# Patient Record
Sex: MALE | NOT HISPANIC OR LATINO | URBAN - NONMETROPOLITAN AREA
[De-identification: names, ages, dates, MRNs, and addresses within clinical notes are randomized per-mention and may not be internally consistent; named-entity substitution may affect disease eponyms.]

---

## 2018-05-22 ENCOUNTER — OFFICE VISIT CONVERTED (OUTPATIENT)
Dept: FAMILY MEDICINE CLINIC | Age: 78
End: 2018-05-22
Attending: NURSE PRACTITIONER

## 2018-08-31 ENCOUNTER — OFFICE VISIT CONVERTED (OUTPATIENT)
Dept: FAMILY MEDICINE CLINIC | Age: 78
End: 2018-08-31
Attending: FAMILY MEDICINE

## 2018-09-07 ENCOUNTER — OFFICE VISIT CONVERTED (OUTPATIENT)
Dept: FAMILY MEDICINE CLINIC | Age: 78
End: 2018-09-07
Attending: FAMILY MEDICINE

## 2018-11-26 ENCOUNTER — OFFICE VISIT CONVERTED (OUTPATIENT)
Dept: FAMILY MEDICINE CLINIC | Age: 78
End: 2018-11-26
Attending: FAMILY MEDICINE

## 2018-12-10 ENCOUNTER — OFFICE VISIT CONVERTED (OUTPATIENT)
Dept: FAMILY MEDICINE CLINIC | Age: 78
End: 2018-12-10
Attending: FAMILY MEDICINE

## 2019-04-09 ENCOUNTER — OFFICE VISIT CONVERTED (OUTPATIENT)
Dept: FAMILY MEDICINE CLINIC | Age: 79
End: 2019-04-09
Attending: FAMILY MEDICINE

## 2021-05-18 NOTE — PROGRESS NOTES
"Geoff Mcduffie 1940     Office/Outpatient Visit    Visit Date: Fri, Sep 7, 2018 11:25 am    Provider: Nestor Guzman MD (Assistant: Sarah Spurling, MA)    Location: [facility]        Electronically signed by Nestor Guzman MD on  09/18/2018 12:58:49 PM                             SUBJECTIVE:        CC:     Edgard is a 78 year old White male.  This is a follow-up visit.  The patient is accompanied into the exam room by his Self.          HPI: He is here today to follow-up on lab results that reveled elevated ALT and AST as well as elevated GGT. He denies ever experiencing jaudice. Denies history of hepatitis infection. Denies abdominal swelling or pain.         He has cut back his EtOH consumption in recent weeks from 2-3 drinks a night to \"a couple drinks a week\"  He has hx of Alcohol Use Disorder in the past.  He admits that since his return to [facility] without Nohemi to keep him in check he had been drinking more, and mostly at home by himself, which he knows is an issues.  He has gone thru AA in the past and doesn't feel like he is at the point that he needs to do that again.         He has received his first Hep A vaccine.         Patient presents with mixed hyperlipidemia.  He specifically denies associated symptoms, including muscle pain and weakness.      I did not on his exam that he had a darkly colored, irregularly shaped skin lesion on his right upper back that I told him I thought ought to be removed.     ROS:     CONSTITUTIONAL:  Negative for chills, fever, night sweats, unintentional weight gain and unintentional weight loss.      E/N/T:  Negative for nasal congestion, frequent rhinorrhea and sore throat.      CARDIOVASCULAR:  Negative for chest pain, palpitations and pedal edema.      RESPIRATORY:  Negative for recent cough and dyspnea.      GASTROINTESTINAL:  Negative for constipation, diarrhea, nausea and vomiting.      NEUROLOGICAL:  Negative for ataxia, dizziness, fainting " and headaches.          PMH/FMH/SH:     Last Reviewed on 12/22/2017 09:11 AM by Nestor Guzman    Past Medical History:         Horseshoe Kidney     Bladder Cancer         PREVENTIVE HEALTH MAINTENANCE             COLORECTAL CANCER SCREENING: Up to date (colonoscopy q10y; sigmoidoscopy q5y; Cologuard q3y) was last done 7/2/12; colonoscopy with normal results     EYE EXAM: was last done 2013 (Vitaly/Julio)     PSA: was last done 5/10/17 with normal results         PAST MEDICAL HISTORY             CURRENT MEDICAL PROVIDERS:    NONE             ADVANCED DIRECTIVES: None         PAST MEDICAL HISTORY                 ADVANCED DIRECTIVES: None         Surgical History:         Tonsillectomy; Adnoid Irradiation      Badder Ca;    Right rotator cuff 2013, ; Procedures: colonoscopy 6/2007, 7/2012         Family History:     Father: Prostate Cancer         Social History:     Occupation: Retired (Prior occupation: TV/radio/media)     Marital Status:      Children: 1 child         Tobacco/Alcohol/Supplements:     Last Reviewed on 8/31/2018 08:55 AM by Ruth Reece    Tobacco: He has a past history of cigarette smoking; quit date:  1990.          Alcohol: Patient has a past history of alcoholism.  His last drink was only socially on rare basis.          Substance Abuse History:     Last Reviewed on 12/22/2017 09:11 AM by Nestor Guzman        Mental Health History:     Last Reviewed on 12/22/2017 09:11 AM by Nestor Guzman        Communicable Diseases (eg STDs):     Last Reviewed on 12/22/2017 09:11 AM by Nestor Guzman            Allergies:     Last Reviewed on 8/31/2018 08:55 AM by Ruth Reece    Sulfas:    Iodine:        Current Medications:     Last Reviewed on 8/31/2018 08:55 AM by Ruth Reece    Lisinopril/Hydrochlorothiazide 20mg/12.5mg Tablet Take 1 tablet(s) by mouth daily     Simvastatin 20mg Tablet Take 1 tablet(s) by mouth at bedtime      Viagra 100mg Tablet Take 1/2 to 1 tablet(s) by mouth prn approximately 1 hour before sexual activity   Max one daily         OBJECTIVE:        Vitals:         Current: 9/7/2018 11:30:28 AM    Ht:  5 ft, 11 in;  Wt: 170.4 lbs;  BMI: 23.8    T: 97 F (oral);  BP: 156/87 mm Hg (left arm, sitting);  P: 74 bpm (right arm (BP Cuff), sitting);  sCr: 0.9 mg/dL;  GFR: 65.29        Exams:     PHYSICAL EXAM:     GENERAL: well nourished;  well groomed;  no apparent distress;     EYES: nonicteric;     E/N/T: NOSE: OROPHARYNX:  normal mucosa, dentition, gingiva, and posterior pharynx;     RESPIRATORY:  lungs clear to auscultation and percussion; symmetric expansion; no dyspnea;     CARDIOVASCULAR: normal rate; rhythm is regular;  no systolic murmur;     GASTROINTESTINAL: nontender, nondistended; no hepatosplenomegaly or masses; no bruits;     SKIN: no spider hemagioma noted; he does have a dark, irregularly shaped nevi right upper back/posterior shoulder area that I circled and told him I thought ought to be removed     NEUROLOGIC: no asterixis.  Rhomberg negative.;     PSYCHIATRIC: no hallucinations.  Good insight into his alcohol issue.  Not quite ready to say he needs to quit alcohol altogether.; He thinks he can cut back and keep consumption under control. He says he has been to enough AA meetings that he know often time abstinence is necessary.         ASSESSMENT           272.2   E78.2  Mixed hyperlipidemia              DDx:     790.4   R74.0  Elevated LFTs              DDx:     239.2   R94.5  Unspecified skin lesion              DDx:     305.00   F10.10  Alcohol abuse, unspecified              DDx:         ORDERS:         Lab Orders:       54061  FERR - [facility] Ferritin Serum  (Send-Out)         49822  IRONP - [facility] Iron and TIBC  (Send-Out)         99740  AHEP4 - [facility] Hepatitis Panel (HAAb, HbcAB, HbsAG, Hep C antibody)  (Send-Out)         12016  Prothrombin time  (In-House)         APPTO  Appointment need  (In-House)                    PLAN:          Mixed hyperlipidemia reviewed lab, cont rx at this point.  I don't thing LFT are due to the medication.         FOLLOW-UP: Schedule a follow-up visit in 3 months..            Orders:       APPTO  Appointment need  (In-House)            Elevated LFTs Will ck lab.  His problem is most likely alcohol.  He wants to try to limit his alcohol use.  He knows the difficulty of that strategy, but wants to try before returning to his life of abstinence .      LABORATORY:  Labs ordered to be performed today include Anemia profile ferritin Serum iron and hepatitis panel.            Orders:       81974  FERR - [facility] Ferritin Serum  (Send-Out)         53873  IRONP - [facility] Iron and TIBC  (Send-Out)         33991  AHEP4 - [facility] Hepatitis Panel (HAAb, HbcAB, HbsAG, Hep C antibody)  (Send-Out)         38616  Prothrombin time  (In-House)            Unspecified skin lesion return for removal          Alcohol abuse, unspecified Will see how he does in limiting his alcohol intake.  May need to go back to AA and total abstinence             Patient Recommendations:        For  Mixed hyperlipidemia:     Schedule a follow-up visit in 3 months.                APPOINTMENT INFORMATION:        Monday Tuesday Wednesday Thursday Friday Saturday Sunday            Time:___________________AM  PM   Date:_____________________             CHARGE CAPTURE           **Please note: ICD descriptions below are intended for billing purposes only and may not represent clinical diagnoses**        Primary Diagnosis:         272.2 Mixed hyperlipidemia            E78.2    Mixed hyperlipidemia              Orders:          80941   Office/outpatient visit; established patient, level 3  (In-House)             APPTO   Appointment need  (In-House)           790.4 Elevated LFTs            R74.0    Nonspecific elevation of levels of transaminase and lactic acid dehydrogenase [LDH]              Orders:          39287   Prothrombin time  (In-House)            239.2 Unspecified skin lesion            R94.5    Abnormal results of liver function studies    305.00 Alcohol abuse, unspecified            F10.10    Alcohol abuse, uncomplicated

## 2021-05-18 NOTE — PROGRESS NOTES
Geoff Mcduff[birth year]Mehnaz 1940     Office/Outpatient Visit    Visit Date: Tue, Apr 9, 2019 10:47 am    Provider: Nestor Guzman MD (Assistant: Sarah Spurling, MA)    Location: [facility]        Electronically signed by Nestor Guzman MD on  04/22/2019 09:51:17 AM                             SUBJECTIVE:        CC:     Edgard is a 78 year old White male.  Medicare Wellness.          HPI: Edgard is in today stating that he is going to be moving to [place] soon to be with his significant other and family.  Says that getting back in relationship with her has helped him address his own health needs and has been instrumental in his cutting back on the alcohol.         Edgard is here for a Medicare wellness visit.  ADVANCED DIRECTIVES: None     Returning to health checkup, the required HRA questions are integrated within this visit note. Family medical history and individual medical/surgical history were reviewed and updated.  A current height, weight, BMI, blood pressure, and pulse were recorded in the vitals section of the note and have been reviewed. Patient's medications, including supplements, were recorded in the chart and reviewed.  Current providers and suppliers were reviewed and updated.          Self-Assessment of Health: He rates his health as excellent. He rates his confidence of being able to control/manage most of his health problems as very confident. His physical/emotional health has limited his social activites not at all.  A review of cognitive impairment was performed, including ability to drive a car, manage finances, and any memory changes, and was found to be negative.  A review of functional ability, including bathing, dressing, walking, and urine/bowel continence as well as level of safety was performed and was found to be negative.  Falls Risk: Has not had any falls or only one fall without injury in the past year.  He denies having trouble hearing the TV/radio when others do not, having to  strain to hear or understand conversations and wearing hearing aid(s).  Concerning home safety, he reports that at home he DOES have adequate lighting, grab bars in the bath, functioning smoke alarms and absence of throw rugs, but not a skid resistant shower/tub or handrails on stairs.  Physical Activity: He exercises for at least 20 minutes 3 or more days/week.; Type of diet patient normally eats is described as well-balanced with fruits and vegetables Tobacco: He has a past history of cigarette smoking; quit date:  Quit Date 25 years ago.  Preventative Health updated today.          PHQ-9 Depression Screening: Completed form scanned and in chart; Total Score 0 Alcohol Consumption Screening: Completed form scanned and in chart; Total Score 3         With regard to the hypertension, he did not bring his blood pressure diary, but says that pressures have been okay.  He is tolerating the medication well without side effects.  Compliance with treatment has been good.          Mixed hyperlipidemia details; compliance with treatment has been good.  He specifically denies associated symptoms, including muscle pain and weakness.      Has a remote history of bladder cancer diagnosed when he was in his 30s and was released by the urologist from further follow-up.     ROS:     CONSTITUTIONAL:  Negative for chills, fatigue and fever.      CARDIOVASCULAR:  Negative for chest pain, orthopnea, paroxysmal nocturnal dyspnea and pedal edema.      RESPIRATORY:  Negative for dyspnea and cough.      GASTROINTESTINAL:  Negative for abdominal pain, heartburn, constipation, diarrhea, and stool changes.      GENITOURINARY:  Negative for dysuria and polyuria.      PSYCHIATRIC:  Negative for anxiety and depression.          PMH/FMH/SH:     Last Reviewed on 4/09/2019 11:58 AM by Nestor Guzman    Past Medical History:         Horseshoe Kidney     Bladder Cancer         PREVENTIVE HEALTH MAINTENANCE             COLORECTAL CANCER  "SCREENING: Up to date (colonoscopy q10y; sigmoidoscopy q5y; Cologuard q3y) was last done 7/2/12; colonoscopy with normal results     DENTAL CLEANING: was last done 6m ago. due soon.      EYE EXAM: was last done March 2019     PSA: was last done 5/10/17 with normal results         PAST MEDICAL HISTORY             CURRENT MEDICAL PROVIDERS:    NONE             ADVANCED DIRECTIVES: None         PAST MEDICAL HISTORY                 ADVANCED DIRECTIVES: None         Surgical History:         Tonsillectomy; Adnoid Irradiation      Badder Ca;    Right rotator cuff 2013, ; Procedures: colonoscopy 6/2007, 7/2012         Family History:     Father: Prostate Cancer         Social History:     Occupation: Retired (Prior occupation: TV/radio/media)     Marital Status:      Children: 1 child         Tobacco/Alcohol/Supplements:     Last Reviewed on 4/09/2019 10:53 AM by Spurling, Sarah C    Tobacco: He has a past history of cigarette smoking; quit date:  1990.          Alcohol: Patient has a past history of alcoholism.  His last drink was only socially on rare basis.      CAGE Questionnaire:  ADMITS to having tried to cut down on alcohol consumption and feeling guilty about drinking.   DENIES being annoyed when questioned about alcohol use or consuming an \"eye opener\" drink in the morning.          Substance Abuse History:     Last Reviewed on 12/22/2017 09:11 AM by Nestor Guzman        Mental Health History:     Last Reviewed on 12/22/2017 09:11 AM by Nestor Guzman        Communicable Diseases (eg STDs):     Last Reviewed on 12/22/2017 09:11 AM by Nestor Guzman            Allergies:     Last Reviewed on 4/09/2019 10:53 AM by Spurling, Sarah C    Sulfas:    Iodine:        Current Medications:     Last Reviewed on 4/09/2019 10:53 AM by Spurling, Sarah C    Lisinopril/Hydrochlorothiazide 20mg/12.5mg Tablet Take 1 tablet(s) by mouth daily     Flomax 0.4mg Capsules Take 1 capsule(s) by mouth daily " 30 min after the same meal     Simvastatin 20mg Tablet Take 1 tablet(s) by mouth at bedtime         OBJECTIVE:        Vitals:         Current: 4/9/2019 10:59:21 AM    Ht:  5 ft, 11 in;  Wt: 175.6 lbs;  BMI: 24.5    T: 97.8 F (oral);  BP: 144/67 mm Hg (right arm, sitting);  P: 73 bpm (right arm (BP Cuff), sitting);  sCr: 1.14 mg/dL;  GFR: 52.21    VA: 20/60 OD, 20/70 OS (near, without correction)        Repeat:     10:59:32 AM     VA:    (20/60 OD,  (, , 20/70 OS, , both eyes 20/60))         Exams:     PHYSICAL EXAM:     GENERAL:  well developed and nourished; appropriately groomed; in no apparent distress;     EYES: Nonicteric and with unremarkable lids, iris and pupils;     E/N/T:  normal EACs, TMs, nasal/oral mucosa, teeth, gingiva, and oropharynx;     NECK: carotid exam reveals no bruits;     RESPIRATORY: normal respiratory rate and pattern with no distress; normal breath sounds with no rales, rhonchi, wheezes or rubs;     CARDIOVASCULAR: normal rate; rhythm is regular;  no systolic murmur;     GASTROINTESTINAL: nontender, nondistended; no hepatosplenomegaly or masses; no bruits;     LYMPHATIC: no enlargement of cervical or facial nodes;     SKIN: multiple SK;     MUSCULOSKELETAL: normal overall tone No pedal edema.;     NEUROLOGIC: No lateralizing deficit.;     PSYCHIATRIC:  appropriate affect and demeanor; normal speech pattern; grossly normal memory;         ASSESSMENT           V70.0   Z00.00  Health checkup              DDx:     V79.0   Z13.89  Screening for depression              DDx:     401.1   I10  Hypertension              DDx:     272.2   E78.2  Mixed hyperlipidemia              DDx:     V10.51   Z85.51  History of bladder cancer              DDx:         ORDERS:         Meds Prescribed:       Refill of: Lisinopril/Hydrochlorothiazide 20mg/12.5mg Tablet Take 1 tablet(s) by mouth daily  #90 (Ninety) tablet(s) Refills: 0       Refill of: Simvastatin 20mg Tablet Take 1 tablet(s) by mouth at bedtime  #90  (Ninety) tablet(s) Refills: 0         Other Orders:         Depression screen negative  (In-House)           Subsequent Annual Well Visit Medicare (x1)                 PLAN: Edgard is been a wonderful member of our community and will be missed by me and multiple other people.  We show him much happiness in his future endeavors and life in [place]          Health checkup We reviewed the preventive service recommendations and created an individualized handout          Screening for depression     MIPS Negative Depression Screen           Orders:         Depression screen negative  (In-House)            Hypertension labs in [place]           Prescriptions:       Refill of: Lisinopril/Hydrochlorothiazide 20mg/12.5mg Tablet Take 1 tablet(s) by mouth daily  #90 (Ninety) tablet(s) Refills: 0       Refill of: Simvastatin 20mg Tablet Take 1 tablet(s) by mouth at bedtime  #90 (Ninety) tablet(s) Refills: 0          Mixed hyperlipidemia Continue on his current medicines for now He says he will get established with a healthcare provider as soon as he arrives to [place] and would prefer to hold off on the lab work until then          History of bladder cancer Likewise we will hold off on urinalysis today but it would probably be a good idea to get that checked once he established with a provider in [place]             Patient Recommendations:    Dragon transcription disclaimer:        Much of this encounter note is an electronic transcription/translation of spoken language to printed text.  The electronic translation of spoken language may permit erroneous, or at times, nonsensical words or phrases to be inadvertently transcribed.  Although I have reviewed the note for such errors, some may still exist.             CHARGE CAPTURE           **Please note: ICD descriptions below are intended for billing purposes only and may not represent clinical diagnoses**        Primary Diagnosis:         V70.0 Health checkup             Z00.00    Encounter for general adult medical examination without abnormal findings              Orders:          28289   Preventive medicine, established patient, age 65+ years  (In-House)                                           Subsequent Annual Well Visit Medicare (x1)         V79.0 Screening for depression            Z13.89    Encounter for screening for other disorder              Orders:          79496 -25  Office/outpatient visit; established patient, level 3  (In-House)                Depression screen negative  (In-House)           401.1 Hypertension            I10    Essential (primary) hypertension    272.2 Mixed hyperlipidemia            E78.2    Mixed hyperlipidemia    V10.51 History of bladder cancer            Z85.51    Personal history of malignant neoplasm of bladder

## 2021-05-18 NOTE — PROGRESS NOTES
Geoff Mcduffie CARMEN 1940     Office/Outpatient Visit    Visit Date: Mon, Dec 10, 2018 09:06 am    Provider: Nestor Guzman MD (Assistant: Megha Macias MA)    Location: [facility]        Electronically signed by Nestor Guzman MD on  12/16/2018 01:34:40 PM                             SUBJECTIVE:        CC: (PATIENT WOULD LIKE TO DISCUSS STARTING FLOMAX FOR FREQUENT URINATION)     Edgard is a 78 year old White male.  This is a follow-up visit.          HPI:         Edgard presents with hypertension.  He did not bring his blood pressure diary, but says that pressures have been okay.  He is tolerating the medication well without side effects.  Compliance with treatment has been good.  Still going to gym 3 times a week.         In regard to the mixed hyperlipidemia, compliance with treatment has been good.  He specifically denies associated symptoms, including muscle pain and weakness.      He says that he is having some issues with nocturia.  During the day has had some diminished flow of urine too.  Sometimes has urgency.     Edgard says that I got his attention talking about his elevated LFT's and alcohol use last visit. Is at the most drinking only one beer with evening meal and that is it.     ROS:     CONSTITUTIONAL:  Negative for chills, fatigue, fever and weight change.      CARDIOVASCULAR:  Negative for chest pain, orthopnea, paroxysmal nocturnal dyspnea and pedal edema.      RESPIRATORY:  Negative for dyspnea and cough.      GASTROINTESTINAL:  Negative for abdominal pain, heartburn, constipation, diarrhea, and stool changes.      GENITOURINARY:  Negative for dysuria and polyuria.      PSYCHIATRIC:  Negative for anxiety and depression.          PM/FM/SH:     Last Reviewed on 12/10/2018 10:06 AM by Nestor Guzman    Past Medical History:         Horseshoe Kidney     Bladder Cancer         PREVENTIVE HEALTH MAINTENANCE             COLORECTAL CANCER SCREENING: Up to date (colonoscopy q10y;  "sigmoidoscopy q5y; Cologuard q3y) was last done 7/2/12; colonoscopy with normal results     EYE EXAM: was last done 2013 (Vitaly/Julio)     PSA: was last done 5/10/17 with normal results         PAST MEDICAL HISTORY             CURRENT MEDICAL PROVIDERS:    NONE             ADVANCED DIRECTIVES: None         PAST MEDICAL HISTORY                 ADVANCED DIRECTIVES: None         Surgical History:         Tonsillectomy; Adnoid Irradiation      Badder Ca;    Right rotator cuff 2013, ; Procedures: colonoscopy 6/2007, 7/2012         Family History:     Father: Prostate Cancer         Social History:     Occupation: Retired (Prior occupation: TV/radio/media)     Marital Status:      Children: 1 child         Tobacco/Alcohol/Supplements:     Last Reviewed on 12/10/2018 09:09 AM by Megha Macias    Tobacco: He has a past history of cigarette smoking; quit date:  1990.          Alcohol: Patient has a past history of alcoholism.  His last drink was only socially on rare basis.      CAGE Questionnaire:  ADMITS to having tried to cut down on alcohol consumption and feeling guilty about drinking.   DENIES being annoyed when questioned about alcohol use or consuming an \"eye opener\" drink in the morning.          Substance Abuse History:     Last Reviewed on 12/22/2017 09:11 AM by Nestor Guzman        Mental Health History:     Last Reviewed on 12/22/2017 09:11 AM by Nestor Guzman        Communicable Diseases (eg STDs):     Last Reviewed on 12/22/2017 09:11 AM by Nestor Guzman            Allergies:     Last Reviewed on 12/10/2018 09:09 AM by Megha Macias    Sulfas:    Iodine:        Current Medications:     Last Reviewed on 12/10/2018 09:09 AM by Megha Macias    Lisinopril/Hydrochlorothiazide 20mg/12.5mg Tablet Take 1 tablet(s) by mouth daily     Simvastatin 20mg Tablet Take 1 tablet(s) by mouth at bedtime     Sildenafil Citrate 20mg Tablet take 2-5 tabs po 1hour before sexual " activity         OBJECTIVE:        Vitals:         Current: 12/10/2018 9:13:48 AM    Ht:  5 ft, 11 in;  Wt: 169.4 lbs;  BMI: 23.6    T: 97.7 F (oral);  BP: 121/61 mm Hg (left arm, sitting);  P: 75 bpm (left arm (BP Cuff), sitting);  sCr: 0.9 mg/dL;  GFR: 65.13        Repeat:     10:12:05 AM     BP:   134/60mm Hg (left arm, sitting, by stiles)         Exams:     PHYSICAL EXAM:     GENERAL:  well developed and nourished; appropriately groomed; in no apparent distress;     EYES: Nonicteric and with unremarkable lids, iris and pupils;     NECK: carotid exam reveals no bruits;     RESPIRATORY: normal respiratory rate and pattern with no distress; normal breath sounds with no rales, rhonchi, wheezes or rubs;     CARDIOVASCULAR: normal rate; rhythm is regular;  no systolic murmur;     LYMPHATIC: no enlargement of cervical or facial nodes;     MUSCULOSKELETAL: normal overall tone No pedal edema.;     NEUROLOGIC: No lateralizing deficit.;     PSYCHIATRIC:  appropriate affect and demeanor; normal speech pattern; grossly normal memory;         ASSESSMENT           401.1   I10  Hypertension              DDx:     272.2   E78.2  Mixed hyperlipidemia              DDx:     788.41   R35.0  Frequent urination              DDx:     790.4   R94.5  Elevated LFTs              DDx:         ORDERS:         Meds Prescribed:       Flomax (Tamsulosin HCl) 0.4mg Capsules Take 1 capsule(s) by mouth daily 30 min after the same meal  #90 (Ninety) capsule(s) Refills: 0         Lab Orders:       [id number]  HTNLP - [facility] CMP AND LIPID: [id number], [id number]  (Send-Out)         68615  GGT - H Glutamyltransferase, gamma  (Send-Out)         20996  BDCB2 - [facility] CBC w/o diff  (Send-Out)         APPTO  Appointment need  (In-House)         35137  BDUAM - [facility] Urinalysis, automated, with micro  (Send-Out)                   PLAN:          Hypertension         FOLLOW-UP: Schedule a follow-up visit in 4 months..            Orders:       APPTO  Appointment need  (In-House)             Mixed hyperlipidemia     LABORATORY:  Labs ordered to be performed today include HTN/Lipid Panel: CMP, Lipid.            Orders:       [id number]  HTNLP - [facility] CMP AND LIPID: [id number], [id number]  (Send-Out)            Frequent urination Will try adding on Flomax to see if helps with symptom relief.     LABORATORY:  Labs ordered to be performed today include urinalysis with micro.            Prescriptions:       Flomax (Tamsulosin HCl) 0.4mg Capsules Take 1 capsule(s) by mouth daily 30 min after the same meal  #90 (Ninety) capsule(s) Refills: 0           Orders:       [id number]  BDUAM - [facility] Urinalysis, automated, with micro  (Send-Out)            Elevated LFTswill repeat labs and again encourage avoidance of alcohol and other hepatotoxins     LABORATORY:  Labs ordered to be performed today include CBC W/O DIFF and GGT.            Orders:       [id number]  GGT - [facility] Glutamyltransferase, gamma  (Send-Out)         83880  BDCB2 - [facility] CBC w/o diff  (Send-Out)               Patient Recommendations:        For  Hypertension:     Schedule a follow-up visit in 4 months.                APPOINTMENT INFORMATION:        Monday Tuesday Wednesday Thursday Friday Saturday Sunday            Time:___________________AM  PM   Date:_____________________             CHARGE CAPTURE           **Please note: ICD descriptions below are intended for billing purposes only and may not represent clinical diagnoses**        Primary Diagnosis:         401.1 Hypertension            I10    Essential (primary) hypertension              Orders:          [id number]   Office/outpatient visit; established patient, level 4  (In-House)             APPTO   Appointment need  (In-House)           272.2 Mixed hyperlipidemia            E78.2    Mixed hyperlipidemia    788.41 Frequent urination            R35.0    Frequency of micturition    790.4 Elevated LFTs            R94.5    Abnormal results of liver function studies

## 2021-05-18 NOTE — PROGRESS NOTES
Geoff Mcduffie CARMEN 1940     Office/Outpatient Visit    Visit Date: Fri, Aug 31, 2018 08:54 am    Provider: Nestor Guzman MD (Assistant: Ruth Reece LPN)    Location: [facility]        Electronically signed by Nestor Guzman MD on  09/08/2018 03:50:16 PM                             SUBJECTIVE:        CC:     Edgard is a 78 year old White male.  med refills and labs         HPI:         Edgard presents with mixed hyperlipidemia.  Compliance with treatment has been good.  Did increase the simvastatin and his is tolerating so far    He specifically denies associated symptoms, including muscle pain and weakness.          In regard to the hypertension, he did not bring his blood pressure diary, but says that pressures have been too high.  He is tolerating the medication well without side effects.  Compliance with treatment has been good.      ROS:     CONSTITUTIONAL:  Negative for chills, fatigue, fever and weight change.      CARDIOVASCULAR:  Negative for chest pain, orthopnea, paroxysmal nocturnal dyspnea and pedal edema.      RESPIRATORY:  Negative for dyspnea and cough.      GASTROINTESTINAL:  Negative for abdominal pain, heartburn, constipation, diarrhea, and stool changes.      GENITOURINARY:  Negative for dysuria and polyuria.      PSYCHIATRIC:  Negative for anxiety and depression.          PMH/FMH/SH:     Last Reviewed on 12/22/2017 09:11 AM by Nestor Guzman    Past Medical History:         Horseshoe Kidney     Bladder Cancer         PREVENTIVE HEALTH MAINTENANCE             COLORECTAL CANCER SCREENING: Up to date (colonoscopy q10y; sigmoidoscopy q5y; Cologuard q3y) was last done 7/2/12; colonoscopy with normal results     EYE EXAM: was last done 2013 (Vitaly/Julio)     PSA: was last done 5/10/17 with normal results         PAST MEDICAL HISTORY             CURRENT MEDICAL PROVIDERS:    NONE             ADVANCED DIRECTIVES: None         PAST MEDICAL HISTORY                 ADVANCED  DIRECTIVES: None         Surgical History:         Tonsillectomy; Adnoid Irradiation      Badder Ca;    Right rotator cuff 2013, ; Procedures: colonoscopy 6/2007, 7/2012         Family History:     Father: Prostate Cancer         Social History:     Occupation: Retired (Prior occupation: TV/radio/media)     Marital Status:      Children: 1 child         Tobacco/Alcohol/Supplements:     Last Reviewed on 5/22/2018 08:50 AM by Martita Parkinson    Tobacco: He has a past history of cigarette smoking; quit date:  1990.          Alcohol: Patient has a past history of alcoholism.  His last drink was only socially on rare basis.          Substance Abuse History:     Last Reviewed on 12/22/2017 09:11 AM by Nestor Guzman        Mental Health History:     Last Reviewed on 12/22/2017 09:11 AM by Nestor Guzman        Communicable Diseases (eg STDs):     Last Reviewed on 12/22/2017 09:11 AM by Nestor Guzman            Allergies:     Last Reviewed on 8/31/2018 08:55 AM by Ruth Reece    Sulfas:    Iodine:        Current Medications:     Last Reviewed on 8/31/2018 08:55 AM by Ruth Reece    Simvastatin 20mg Tablet Take 1 tablet(s) by mouth at bedtime     Zestril 20mg Tablet Take 1 tablet(s) by mouth daily     Viagra 100mg Tablet Take 1/2 to 1 tablet(s) by mouth prn approximately 1 hour before sexual activity   Max one daily         OBJECTIVE:        Vitals:         Current: 8/31/2018 8:55:02 AM    Ht:  5 ft, 11 in;  Wt: 170.6 lbs;  BMI: 23.8    T: 97.6 F (oral);  BP: 162/64 mm Hg (right arm, sitting);  P: 70 bpm (right arm (BP Cuff), sitting);  sCr: 0.84 mg/dL;  GFR: 69.99        Repeat:     8:59:12 AM     BP:   136/72mm Hg (left arm, sitting)     9:37:25 AM     BP:   150/76mm Hg (right arm, sitting, by stiles)     9:38:05 AM     BP:   150/78mm Hg (left arm, sitting, by stiles)         Exams:     PHYSICAL EXAM:     GENERAL:  well developed and nourished; appropriately groomed;  in no apparent distress;     EYES: Nonicteric and with unremarkable lids, iris and pupils;     E/N/T:  normal EACs, TMs, nasal/oral mucosa, teeth, gingiva, and oropharynx;     NECK: carotid exam reveals no bruits;     RESPIRATORY: normal respiratory rate and pattern with no distress; normal breath sounds with no rales, rhonchi, wheezes or rubs;     CARDIOVASCULAR: normal rate; rhythm is regular;  no systolic murmur;     LYMPHATIC: no enlargement of cervical or facial nodes;     MUSCULOSKELETAL: normal overall tone No pedal edema.;     NEUROLOGIC: No lateralizing deficit.;     PSYCHIATRIC:  appropriate affect and demeanor; normal speech pattern; grossly normal memory;         ASSESSMENT           272.2   E78.2  Mixed hyperlipidemia              DDx:     401.1   I10  Hypertension              DDx:         ORDERS:         Meds Prescribed:       Lisinopril/Hydrochlorothiazide 20mg/12.5mg Tablet Take 1 tablet(s) by mouth daily  #90 (Ninety) tablet(s) Refills: 0       Refill of: Simvastatin 20mg Tablet Take 1 tablet(s) by mouth at bedtime  #90 (Ninety) tablet(s) Refills: 1         Lab Orders:       [id number]  HTN - [facility] CMP AND LIPID: [id number], [id number]  (Send-Out)                   PLAN: rec hep A vaccine          Mixed hyperlipidemia     LABORATORY:  Labs ordered to be performed today include HTN/Lipid Panel: CMP, Lipid.            Prescriptions:       Refill of: Simvastatin 20mg Tablet Take 1 tablet(s) by mouth at bedtime  #90 (Ninety) tablet(s) Refills: 1           Orders:       [id number]  HTN - [facility] CMP AND LIPID: [id number], [id number]  (Send-Out)            Hypertension will add diuretic to get better control.  discussed risks           Prescriptions:       Lisinopril/Hydrochlorothiazide 20mg/12.5mg Tablet Take 1 tablet(s) by mouth daily  #90 (Ninety) tablet(s) Refills: 0             CHARGE CAPTURE           **Please note: ICD descriptions below are intended for billing purposes only and may not represent clinical diagnoses**         Primary Diagnosis:         272.2 Mixed hyperlipidemia            E78.2    Mixed hyperlipidemia              Orders:          [id number]   Office/outpatient visit; established patient, level 3  (In-House)           401.1 Hypertension            I10    Essential (primary) hypertension

## 2021-05-18 NOTE — PROGRESS NOTES
FroyGeoff 1940     Office/Outpatient Visit    Visit Date: Mon, Nov 26, 2018 11:51 am    Provider: Nestor Guzman MD (Assistant: Juwan Mcqueen)    Location: [facility]        Electronically signed by Nestor Guzman MD on  12/02/2018 10:23:22 PM                             SUBJECTIVE:        CC:     Edgard is a 78 year old White male.  He is here for biopsy and skin lesion on back.          HPI:     Here for removal of pigmented skin lesion on right posterior shoulder area     PM/FM/SH:     Last Reviewed on 12/22/2017 09:11 AM by Nestor Guzman    Past Medical History:         Horseshoe Kidney     Bladder Cancer         PREVENTIVE HEALTH MAINTENANCE             COLORECTAL CANCER SCREENING: Up to date (colonoscopy q10y; sigmoidoscopy q5y; Cologuard q3y) was last done 7/2/12; colonoscopy with normal results     EYE EXAM: was last done 2013 (Vitaly/Julio)     PSA: was last done 5/10/17 with normal results         PAST MEDICAL HISTORY             CURRENT MEDICAL PROVIDERS:    NONE             ADVANCED DIRECTIVES: None         PAST MEDICAL HISTORY                 ADVANCED DIRECTIVES: None         Surgical History:         Tonsillectomy; Adnoid Irradiation      [facility] Ca;    Right rotator cuff 2013, ; Procedures: colonoscopy 6/2007, 7/2012         Family History:     Father: Prostate Cancer         Social History:     Occupation: Retired (Prior occupation: TV/radio/media)     Marital Status:      Children: 1 child         Tobacco/Alcohol/Supplements:     Last Reviewed on 9/07/2018 11:27 AM by Spurling, Sarah C    Tobacco: He has a past history of cigarette smoking; quit date:  1990.          Alcohol: Patient has a past history of alcoholism.  His last drink was only socially on rare basis.      CAGE Questionnaire:  ADMITS to having tried to cut down on alcohol consumption and feeling guilty about drinking.   DENIES being annoyed when questioned about alcohol use or  "consuming an \"eye opener\" drink in the morning.          Substance Abuse History:     Last Reviewed on 12/22/2017 09:11 AM by Nestor Guzman        Mental Health History:     Last Reviewed on 12/22/2017 09:11 AM by Nestor Guzman        Communicable Diseases (eg STDs):     Last Reviewed on 12/22/2017 09:11 AM by Nestor Guzman            Allergies:     Last Reviewed on 9/07/2018 11:27 AM by Spurling, Sarah C    Sulfas:    Iodine:        Current Medications:     Last Reviewed on 9/07/2018 11:28 AM by Spurling, Sarah C    Lisinopril/Hydrochlorothiazide 20mg/12.5mg Tablet Take 1 tablet(s) by mouth daily     Simvastatin 20mg Tablet Take 1 tablet(s) by mouth at bedtime     Viagra 100mg Tablet Take 1/2 to 1 tablet(s) by mouth prn approximately 1 hour before sexual activity   Max one daily         OBJECTIVE:        Vitals:         Current: 11/26/2018 11:58:03 AM    Ht:  5 ft, 11 in;  Wt: 168.2 lbs;  BMI: 23.5    T: 97.6 F (oral);  BP: 141/65 mm Hg (left arm, sitting);  P: 71 bpm (left arm (BP Cuff), sitting);  sCr: 0.9 mg/dL;  GFR: 64.93        Exams:     PHYSICAL EXAM:     SKIN: dual colored, darkly pigmented lesion on posterior right shoulder area, 6-7 mm diameter, irregular borders.;         Procedures:     Atypical skin lesion         Procedure Note:     Informed consent obtained in writing.  He expresses understanding that a scar may remain after the lesion is removed.  Sterile technique is observed.       Benign appearing lesion #1 is located on right posterior shoulder area.   The method of removal is via punch biopsy tool.   Anesthesia was obtained with 1.5 cc of 1% lidocaine with epinephrine.   The wound is closed with 3  mattress sutures simple interrupted stitch(es) using 4-0 Ethilon.   The specimen is sent for pathology review.              ASSESSMENT           238.2   D48.5  Atypical skin lesion              DDx:         ORDERS:         Lab Orders:       [id number]  Pathology consultation on " referred slides  (Send-Out)           Procedures Ordered:       [id number]  Excision, benign lesion, except skin tag, trunk, arms, legs; lesion diameter 0.6 to 1.0 cm  (In-House)         13377  Handlg&/or convey of spec for TR office to lab  (In-House)                   PLAN:          Atypical skin lesion           Orders:       [id number]  Excision, benign lesion, except skin tag, trunk, arms, legs; lesion diameter 0.6 to 1.0 cm  (In-House)         43339  Pathology consultation on referred slides  (Send-Out)         19241  Handlg&/or convey of spec for TR office to lab  (In-House)               CHARGE CAPTURE           **Please note: ICD descriptions below are intended for billing purposes only and may not represent clinical diagnoses**        Primary Diagnosis:         238.2 Atypical skin lesion            D48.5    Neoplasm of uncertain behavior of skin              Orders:          [id number]   Excision, benign lesion, except skin tag, trunk, arms, legs; lesion diameter 0.6 to 1.0 cm  (In-House)             14256   Handlg&/or convey of spec for TR office to lab  (In-House)

## 2021-05-18 NOTE — PROGRESS NOTES
Geoff McduffieMehnaz 1940     Office/Outpatient Visit    Visit Date: Tue, May 22, 2018 08:46 am    Provider: Lisa Flores N.P. (Assistant: Martita Parkinson MA)    Location: [facility]        Electronically signed by Lisa Flores N.P. on  05/22/2018 10:03:03 AM                             SUBJECTIVE:        CC: patient also seen by ANIA Segovia NP student     Edgard is a 78 year old White male.  presents today due to RASH ON LEFT SIDE OF BODY         HPI:         Rash noted.  It is located primarily L upper thigh, L upper shoulder, scrotum.  , L buttock, R thigh, R inner wrist With regard to the rash, this has been a problem for the past 2  days (enter #) days.  The rash has not occurred previously.  He describes the rash as red, vesicular or bullous, and distributed in irregularly shaped patches.  The rash is moderately pruritic.  He denies arthralgias, cough, facial edema, fever, headache, lymphadenopathy, myalgias, peripheral edema and stiff neck.  Possible contributing factors include exposure to poison ivy, tick bite ( Inner R thigh, 1 month ago ) and Poison Ivy exposure 1 1/2 weeks ago.  He denies recent travel, addition of a new medication, use of new detergent, use of a new soap or facial cleanser or exposure to sick contacts.  Past medical history is significant for Shingles 10 years ago.      ROS:     CONSTITUTIONAL:  Negative for fatigue, fever and night sweats.      INTEGUMENTARY:  Positive for pruritis and rash.      NEUROLOGICAL:  Negative for dizziness, headaches and weakness.      HEMATOLOGIC/LYMPHATIC:  Negative for lymphadenopathy.      ALLERGIC/IMMUNOLOGIC:  Negative for seasonal allergies.      PSYCHIATRIC:  Negative for anxiety, depression and sadness.          PMH/FMH/SH:     Last Reviewed on 12/22/2017 09:11 AM by Nestor Guzman    Past Medical History:         Horseshoe Kidney     Bladder Cancer         PREVENTIVE HEALTH MAINTENANCE             COLORECTAL CANCER  SCREENING: Up to date (colonoscopy q10y; sigmoidoscopy q5y; Cologuard q3y) was last done 7/2/12; colonoscopy with normal results     EYE EXAM: was last done 2013 (Vitaly/Julio)     PSA: was last done 5/10/17 with normal results         PAST MEDICAL HISTORY             CURRENT MEDICAL PROVIDERS:    NONE             ADVANCED DIRECTIVES: None         PAST MEDICAL HISTORY                 ADVANCED DIRECTIVES: None         Surgical History:         Tonsillectomy; Adnoid Irradiation      Badder Ca;    Right rotator cuff 2013, ; Procedures: colonoscopy 6/2007, 7/2012         Family History:     Father: Prostate Cancer         Social History:     Occupation: Retired (Prior occupation: TV/radio/media)     Marital Status:      Children: 1 child         Tobacco/Alcohol/Supplements:     Last Reviewed on 12/22/2017 09:11 AM by Nestor Guzman    Tobacco: He has a past history of cigarette smoking; quit date:  1990.          Alcohol: Patient has a past history of alcoholism.  His last drink was only socially on rare basis.          Substance Abuse History:     Last Reviewed on 12/22/2017 09:11 AM by Nestor Guzman        Mental Health History:     Last Reviewed on 12/22/2017 09:11 AM by Nestor Guzman        Communicable Diseases (eg STDs):     Last Reviewed on 12/22/2017 09:11 AM by Nestor Guzman            Current Problems:     Last Reviewed on 12/22/2017 09:11 AM by Nestor Guzman    History of tobacco abuse     Hemorrhoids, NOS     Erectile dysfunction due to organic reasons     Irritated seborrheic keratosis     Heel pain     Hearing difficulties     Erectile dysfunction     Hypertension     Mixed hyperlipidemia     Cardiac dysrhythmia     Screening for prostate cancer         Allergies:     Last Reviewed on 12/22/2017 09:11 AM by Nestor Guzman    Sulfas:    Iodine:        Current Medications:     Last Reviewed on 12/22/2017 09:11 AM by Nestor Guzman     Simvastatin 20mg Tablet Take 1 tablet(s) by mouth at bedtime     Zestril 20mg Tablet Take 1 tablet(s) by mouth daily     Viagra 100mg Tablet Take 1/2 to 1 tablet(s) by mouth prn approximately 1 hour before sexual activity   Max one daily         OBJECTIVE:        Vitals:         Current: 5/22/2018 8:48:07 AM    Ht:  5 ft, 11 in;  Wt: 171.1 lbs;  BMI: 23.9    T: 97.8 F (oral);  BP: 153/87 mm Hg (left arm, sitting);  P: 72 bpm (left arm (BP Cuff), sitting);  sCr: 0.84 mg/dL;  GFR: 70.08        Repeat:     8:52:05 AM     BP:   152/78mm Hg (right arm, sitting)         Exams:     PHYSICAL EXAM:     GENERAL: well developed, well nourished;  well groomed;  no apparent distress;     EYES: conjunctiva and cornea are normal; PERRLA;     E/N/T: NOSE: turbinates are mildly swollen bilaterally;  OROPHARYNX:  normal mucosa, dentition, gingiva, and posterior pharynx;     SKIN: a rash is noted bilateral thighs, scrotum, L buttock, L upper shoulder;  the color is mainly red;  it is best characterized as papular;  clusters of erythematous papules noted on left inner thigh proximal to knee and left upper chest proximal to shoulder. no drainage noted. nonblanchable. 12-15 papules in each area. 3-4 papules erythematous noted on right inner wrist, radial side.;     MUSCULOSKELETAL: normal gait;     PSYCHIATRIC: appropriate affect and demeanor; normal psychomotor function; normal speech pattern;         ASSESSMENT           782.1   R21  Rash              DDx:         ORDERS:         Meds Prescribed:       Medrol (Methylprednisolone) 4mg Dosepak Take as directed with food  #1 (One) dose pack Refills: 0                 PLAN:          Rash         Discussed differntials with patient. Discussed use of steroids and how to take with patient. Do not take with Viagra. RTC for OV for new/worsening sx or no resolution after steroids. Can take benadryl PRN for itching. Discussed BP elevated today, needs a 6 mo f/u wiht Dr. Guzman, needs to monitor  BP outside of office and bring readings into Dr. Guzman.            Prescriptions:       Medrol (Methylprednisolone) 4mg Dosepak Take as directed with food  #1 (One) dose pack Refills: 0             CHARGE CAPTURE           **Please note: ICD descriptions below are intended for billing purposes only and may not represent clinical diagnoses**        Primary Diagnosis:         782.1 Rash            R21    Rash and other nonspecific skin eruption              Orders:          53405   Office/outpatient visit; established patient, level 3  (In-House)

## 2021-07-01 VITALS
WEIGHT: 168.2 LBS | HEIGHT: 71 IN | HEART RATE: 71 BPM | BODY MASS INDEX: 23.55 KG/M2 | TEMPERATURE: 97.6 F | SYSTOLIC BLOOD PRESSURE: 141 MMHG | DIASTOLIC BLOOD PRESSURE: 65 MMHG

## 2021-07-01 VITALS
HEART RATE: 73 BPM | SYSTOLIC BLOOD PRESSURE: 144 MMHG | TEMPERATURE: 97.8 F | DIASTOLIC BLOOD PRESSURE: 67 MMHG | WEIGHT: 175.6 LBS | BODY MASS INDEX: 24.58 KG/M2 | HEIGHT: 71 IN

## 2021-07-01 VITALS
WEIGHT: 170.4 LBS | SYSTOLIC BLOOD PRESSURE: 156 MMHG | DIASTOLIC BLOOD PRESSURE: 87 MMHG | HEIGHT: 71 IN | BODY MASS INDEX: 23.85 KG/M2 | HEART RATE: 74 BPM | TEMPERATURE: 97 F

## 2021-07-01 VITALS
TEMPERATURE: 97.8 F | SYSTOLIC BLOOD PRESSURE: 152 MMHG | BODY MASS INDEX: 23.95 KG/M2 | HEART RATE: 72 BPM | HEIGHT: 71 IN | DIASTOLIC BLOOD PRESSURE: 78 MMHG | WEIGHT: 171.1 LBS

## 2021-07-01 VITALS
TEMPERATURE: 97.6 F | HEIGHT: 71 IN | SYSTOLIC BLOOD PRESSURE: 150 MMHG | HEART RATE: 70 BPM | WEIGHT: 170.6 LBS | BODY MASS INDEX: 23.88 KG/M2 | DIASTOLIC BLOOD PRESSURE: 78 MMHG

## 2021-07-01 VITALS
BODY MASS INDEX: 23.72 KG/M2 | HEART RATE: 75 BPM | WEIGHT: 169.4 LBS | HEIGHT: 71 IN | DIASTOLIC BLOOD PRESSURE: 60 MMHG | TEMPERATURE: 97.7 F | SYSTOLIC BLOOD PRESSURE: 134 MMHG